# Patient Record
(demographics unavailable — no encounter records)

---

## 2024-11-11 NOTE — WORK
[Torn Ligament/Tendon/Muscle] : torn ligament, tendon or muscle [Was the competent medical cause of the injury] : was the competent medical cause of the injury [Are consistent with the injury] : are consistent with the injury [Consistent with my objective findings] : consistent with my objective findings [Does not reveal pre-existing condition(s) that may affect treatment/prognosis] : does not reveal pre-existing condition(s) that may affect treatment/prognosis [N/A] : : Not Applicable [Patient] : patient [No Rx restrictions] : No Rx restrictions. [I provided the services listed above] :  I provided the services listed above. [Severe Partial] : severe partial [Can return to work with limitations on ______] : can return to work with limitations on [unfilled] [Lifting] : lifting [FreeTextEntry1] : fair

## 2024-11-11 NOTE — HISTORY OF PRESENT ILLNESS
[de-identified] : The patient is presenting to the office c/o ongoing right shoulder pain as the result of a work related injury on 11/11/22. The patient works as a [employer]. Patient reports he was attempting to restrain when he started experiencing pain. The patient denies any problems before the injury. Patient has been doing supervised PT with slight improvement. Patient reports pain has been getting progressively worse. Pain is worse at night. Patient denies numbness or tingling. The patient has not been working at 100% capacity since the injury. Presents to the office with MRI for review.  5/8/23: Patient presents to the office for right shoulder pain. Pt states AC joint injection worked great since last visit.  He did however report waking up with severe lateral and deep shoulder pain 2 weeks ago.  5/15/23 Pt presents to the office for right shoulder pain.  He reports pain deep in the joint and lateral.  Previous AC joint provided significant relief.  6/19/23: Pt presents to the office for right shoulder pain. Prior GH injection didn't provide any relief. Pain has been better since the last cortisone injection but pain has recently gotten worse over the last couple of weeks.   11/3/23:  Pt presents to the office for right shoulder pain.  Patient had a reoccurrence of right shoulder pain and is now not working. Patient never underwent MRI arthrogram of right shoulder.  11/27/23: Patient here for right shoulder pain. Pt here for review MRI arthrogram results. Pt reports pain has not gotten better since last visit. Pt would like to discuss definitive treatment options  12/20/23: Patient presents today for a follow-up of worsening right shoulder pain.  He has failed all conservative management including multiple cortisone injections and physical therapy.  Pain has been getting progressively worse.  Wishes to discuss surgery 1/5/24: Patient presents 8days s/p right arthroscopic rotator cuff debridement, SAD open DCE and biceps tenodesis. Patient is doing well, pain is controlled.  Patient has started to wean from sling. ROM improving. Denies any fever, chills, drainage. 2/2/24: Patient presents 5 weeks  s/p right arthroscopic rotator cuff debridement, SAD open DCE and biceps tenodesis. Pt doing well and is happy with his progress.  Range of motion and strength progressing. 3/15/24: Patient returns today nearly 3 months removed from right shoulder arthroscopic debridement, subacromial decompression and open distal clavicle excision and biceps tenodesis.  He continues to report steady improvement with physical therapy.  Does report mild residual weakness and lateral discomfort with overhead activity. 5/6/24: Patient presents 4.5 months removed from right shoulder arthroscopic debridement, subacromial decompression and open distal clavicle excision and biceps tenodesis. Patient notes pain with motions away from his body.  6/21/24: Patient presents nearly 6 months removed from right shoulder arthroscopic debridement, subacromial decompression and open distal clavicle excision and biceps tenodesis. Pt admits the prior glenohumeral injection has been providing moderate relief. Pt still has pain with internal rotation 11/11/24: Patient presents today 10 months removed from right shoulder arthroscopic debridement, subacromial depression open distal clavicle excision and bicep tenodesis.  Admits to mild residual lateral pain.  Pain is worse with overhead motion.  Overall is significantly better than before surgery.

## 2024-11-11 NOTE — PHYSICAL EXAM
[de-identified] : Constitutional: The general appearance of the patient is well developed, well nourished, no deformities and well groomed. Normal  Gait: Gait and function is as follows: normal gait.  Skin: Head and neck visualized skin is normal. Left upper extremity visualized skin is normal. Right upper extremity visualized skin is normal. Thoracic Skin of the thoracic spine shows visualized skin is normal.  Cardiovascular: palpable radial pulse bilaterally, good capillary refill in digits of the bilateral upper extremities and no temperature or color changes in the bilateral upper extremities.  Lymphatic: Normal Palpation of lymph nodes in the cervical.  Neurologic: fine motor control in the bilateral upper extremities is intact. Deep Tendon Reflexes in Upper and Lower Extremities Negative Epstein's in the bilateral upper extremities. The patient is oriented to time, place and person. Sensation to light touch intact in the bilateral upper extremities. Mood and Affect is normal.  Right Shoulder:  Inspection of the shoulder/upper arm is as follows: Stable shoulder. Palpation of the shoulder/upper arm is as follows: There is mild diffuse tenderness . Range of motion of the shoulder is as follows: Limited secondary to immobilization/surgery. Strength of the shoulder is as follows:  Deltoid 5/5 Ligament Stability and Special Tests of the shoulder is as follows: Stable shoulder Incisions benign, no erythema/ swelling. Left Shoulder: Inspection of the shoulder/upper arm is as follows: There is a full, pain-free, stable range of motion of the shoulder with normal strength and no tenderness to palpation.  Neck:  Inspection / Palpation of the cervical spine is as follows: mild paracervical tenderness. Range of motion of the cervical spine is as follows: moderately decreased range of motion of the cervical spine. Stability testing for the cervical spine is as follows Stable range of motion.  Back, including spine: Inspection / Palpation of the thoracic/lumbar spine is as follows: There is a full, pain free, stable range of motion of the thoracic spine with a normal tone and not tenderness to palpation..

## 2024-11-11 NOTE — DISCUSSION/SUMMARY
[de-identified] : RUE: tight with IR  This is a 37 yo male nearly 11 months s/p right arthroscopic rotator cuff debridement, subacromial decompression, open AC joint stabilization and mini open biceps tenodesis.  Range of motion and strength steadily improving. Patient does have slight lateral pain persistent at this point likely secondary to tight posterior capsule. Emphasized the importance of continuing with home exercise stretching program. Overall AC joint is significantly better than before surgery. He will continue to progress at all activities as tolerated. Patient will continue to work modified duty. He will follow-up in 6 weeks.  He will be considered to reach maximal medical improvement at that point.  (1) We discussed a comprehensive treatment plans that included a prescription management plan involving the use of prescription strength medications to include Ibuprofen 600-800 mg TID, versus 500-650 mg Tylenol. We also discussed prescribing topical diclofenac (Voltaren gel) as well as once daily Meloxicam 15 mg. (2) The patient has More Than One chronic injuries/illnesses as outlined, discussed, and documented by ICD 10 codes listed, as well as the HPI and Plan section. There is a moderate risk of morbidity with further treatment, especially from use of prescription strength medications and possible side effects of these medications which include upset stomach and cardiac/renal issues with long term use were discussed. (3) I recommended that the patient follow-up with their medical physician to discuss any significant specific potential issues with long term use such as interactions with current medications or with exacerbation of underlying medical morbidities.

## 2024-11-11 NOTE — DATA REVIEWED
[FreeTextEntry1] : MRI arthrogram right shoulder 11/14/23 [facility]  Severe AC joint arthrosis, more pronounced than on the prior study. There is mild osteolysis along the distal clavicle and surrounding bone marrow edema.  Mild to moderate supraspinatus tendinosis with slight fraying of the bursal margin. There is no discrete supraspinatus tendon tear.  Mild infraspinatus tendinosis with a very shallow articular sided tear at the central aspect of the insertion.  Shallow partial-thickness tear at the direct superior chondral labral junction.

## 2024-11-11 NOTE — HISTORY OF PRESENT ILLNESS
[de-identified] : The patient is presenting to the office c/o ongoing right shoulder pain as the result of a work related injury on 11/11/22. The patient works as a [employer]. Patient reports he was attempting to restrain when he started experiencing pain. The patient denies any problems before the injury. Patient has been doing supervised PT with slight improvement. Patient reports pain has been getting progressively worse. Pain is worse at night. Patient denies numbness or tingling. The patient has not been working at 100% capacity since the injury. Presents to the office with MRI for review.  5/8/23: Patient presents to the office for right shoulder pain. Pt states AC joint injection worked great since last visit.  He did however report waking up with severe lateral and deep shoulder pain 2 weeks ago.  5/15/23 Pt presents to the office for right shoulder pain.  He reports pain deep in the joint and lateral.  Previous AC joint provided significant relief.  6/19/23: Pt presents to the office for right shoulder pain. Prior GH injection didn't provide any relief. Pain has been better since the last cortisone injection but pain has recently gotten worse over the last couple of weeks.   11/3/23:  Pt presents to the office for right shoulder pain.  Patient had a reoccurrence of right shoulder pain and is now not working. Patient never underwent MRI arthrogram of right shoulder.  11/27/23: Patient here for right shoulder pain. Pt here for review MRI arthrogram results. Pt reports pain has not gotten better since last visit. Pt would like to discuss definitive treatment options  12/20/23: Patient presents today for a follow-up of worsening right shoulder pain.  He has failed all conservative management including multiple cortisone injections and physical therapy.  Pain has been getting progressively worse.  Wishes to discuss surgery 1/5/24: Patient presents 8days s/p right arthroscopic rotator cuff debridement, SAD open DCE and biceps tenodesis. Patient is doing well, pain is controlled.  Patient has started to wean from sling. ROM improving. Denies any fever, chills, drainage. 2/2/24: Patient presents 5 weeks  s/p right arthroscopic rotator cuff debridement, SAD open DCE and biceps tenodesis. Pt doing well and is happy with his progress.  Range of motion and strength progressing. 3/15/24: Patient returns today nearly 3 months removed from right shoulder arthroscopic debridement, subacromial decompression and open distal clavicle excision and biceps tenodesis.  He continues to report steady improvement with physical therapy.  Does report mild residual weakness and lateral discomfort with overhead activity. 5/6/24: Patient presents 4.5 months removed from right shoulder arthroscopic debridement, subacromial decompression and open distal clavicle excision and biceps tenodesis. Patient notes pain with motions away from his body.  6/21/24: Patient presents nearly 6 months removed from right shoulder arthroscopic debridement, subacromial decompression and open distal clavicle excision and biceps tenodesis. Pt admits the prior glenohumeral injection has been providing moderate relief. Pt still has pain with internal rotation 11/11/24: Patient presents today 10 months removed from right shoulder arthroscopic debridement, subacromial depression open distal clavicle excision and bicep tenodesis.  Admits to mild residual lateral pain.  Pain is worse with overhead motion.  Overall is significantly better than before surgery.

## 2024-11-11 NOTE — PHYSICAL EXAM
[de-identified] : Constitutional: The general appearance of the patient is well developed, well nourished, no deformities and well groomed. Normal  Gait: Gait and function is as follows: normal gait.  Skin: Head and neck visualized skin is normal. Left upper extremity visualized skin is normal. Right upper extremity visualized skin is normal. Thoracic Skin of the thoracic spine shows visualized skin is normal.  Cardiovascular: palpable radial pulse bilaterally, good capillary refill in digits of the bilateral upper extremities and no temperature or color changes in the bilateral upper extremities.  Lymphatic: Normal Palpation of lymph nodes in the cervical.  Neurologic: fine motor control in the bilateral upper extremities is intact. Deep Tendon Reflexes in Upper and Lower Extremities Negative Epstein's in the bilateral upper extremities. The patient is oriented to time, place and person. Sensation to light touch intact in the bilateral upper extremities. Mood and Affect is normal.  Right Shoulder:  Inspection of the shoulder/upper arm is as follows: Stable shoulder. Palpation of the shoulder/upper arm is as follows: There is mild diffuse tenderness . Range of motion of the shoulder is as follows: Limited secondary to immobilization/surgery. Strength of the shoulder is as follows:  Deltoid 5/5 Ligament Stability and Special Tests of the shoulder is as follows: Stable shoulder Incisions benign, no erythema/ swelling. Left Shoulder: Inspection of the shoulder/upper arm is as follows: There is a full, pain-free, stable range of motion of the shoulder with normal strength and no tenderness to palpation.  Neck:  Inspection / Palpation of the cervical spine is as follows: mild paracervical tenderness. Range of motion of the cervical spine is as follows: moderately decreased range of motion of the cervical spine. Stability testing for the cervical spine is as follows Stable range of motion.  Back, including spine: Inspection / Palpation of the thoracic/lumbar spine is as follows: There is a full, pain free, stable range of motion of the thoracic spine with a normal tone and not tenderness to palpation..

## 2024-11-11 NOTE — DISCUSSION/SUMMARY
[de-identified] : RUE: tight with IR  This is a 39 yo male nearly 11 months s/p right arthroscopic rotator cuff debridement, subacromial decompression, open AC joint stabilization and mini open biceps tenodesis.  Range of motion and strength steadily improving. Patient does have slight lateral pain persistent at this point likely secondary to tight posterior capsule. Emphasized the importance of continuing with home exercise stretching program. Overall AC joint is significantly better than before surgery. He will continue to progress at all activities as tolerated. Patient will continue to work modified duty. He will follow-up in 6 weeks.  He will be considered to reach maximal medical improvement at that point.  (1) We discussed a comprehensive treatment plans that included a prescription management plan involving the use of prescription strength medications to include Ibuprofen 600-800 mg TID, versus 500-650 mg Tylenol. We also discussed prescribing topical diclofenac (Voltaren gel) as well as once daily Meloxicam 15 mg. (2) The patient has More Than One chronic injuries/illnesses as outlined, discussed, and documented by ICD 10 codes listed, as well as the HPI and Plan section. There is a moderate risk of morbidity with further treatment, especially from use of prescription strength medications and possible side effects of these medications which include upset stomach and cardiac/renal issues with long term use were discussed. (3) I recommended that the patient follow-up with their medical physician to discuss any significant specific potential issues with long term use such as interactions with current medications or with exacerbation of underlying medical morbidities.

## 2024-12-23 NOTE — WORK
[Torn Ligament/Tendon/Muscle] : torn ligament, tendon or muscle [Was the competent medical cause of the injury] : was the competent medical cause of the injury [Are consistent with the injury] : are consistent with the injury [Consistent with my objective findings] : consistent with my objective findings [Severe Partial] : severe partial [Does not reveal pre-existing condition(s) that may affect treatment/prognosis] : does not reveal pre-existing condition(s) that may affect treatment/prognosis [Can return to work with limitations on ______] : can return to work with limitations on [unfilled] [Lifting] : lifting [N/A] : : Not Applicable [Patient] : patient [No Rx restrictions] : No Rx restrictions. [I provided the services listed above] :  I provided the services listed above. [FreeTextEntry1] : fair [Has the patient reached Maximum Medical Improvement? If yes, indicate date___] : Yes, on [unfilled] [Is there permanent partial impairment?] : Yes [FreeTextEntry6] :  SLU Right shoulder 30% Of the note patient injury to groin which healed uneventfully with no residual deficit. 0% Groin

## 2024-12-23 NOTE — DISCUSSION/SUMMARY
[de-identified] : RUE: tight with IR  This is a 37 yo male nearly 1 year s/p right arthroscopic rotator cuff debridement, subacromial decompression, open AC joint stabilization and mini open biceps tenodesis.  Range of motion and strength steadily improving. Overall AC joint is significantly better than before surgery. He will continue to progress at all activities as tolerated. Patient will continue to work light duty Patient has reached maximum medical improvement and is amenable to SLU    RUE (injured side) Active:  | Abduction: 90 | ER @ 0 abduction: 30 | ER @90 abduction: 20 | IR to L5 (20 deg) Passive:  | Abduction: 90 | ER @ 0 abduction: 40 | ER @90 abduction: 20 | IR to L5 (20 deg)   LUE Uninvolved: Active:  | Abduction: 130 | ER @ 0 abduction: 50 | ER @90 abduction: 50 | IR to L1 (40 deg) Passive:  | Abduction: 140 | ER @ 0 abduction: 50 | ER @90 abduction: 50 | IR to L1 (40 deg)  *measurements taken active and passive 3x each with highest value recorded.   The Patient has moderate loss of  Internal rotation The patient has moderate loss of  abduction The patient has had a resection of the distal end of the clavicle  The patient had partial rupture of the long head of the biceps requiring transfer   SLU Right shoulder 30% Of the note patient injury to groin which healed uneventfully with no residual deficit.   (1) We discussed a comprehensive treatment plans that included a prescription management plan involving the use of prescription strength medications to include Ibuprofen 600-800 mg TID, versus 500-650 mg Tylenol. We also discussed prescribing topical diclofenac (Voltaren gel) as well as once daily Meloxicam 15 mg. (2) The patient has More Than One chronic injuries/illnesses as outlined, discussed, and documented by ICD 10 codes listed, as well as the HPI and Plan section. There is a moderate risk of morbidity with further treatment, especially from use of prescription strength medications and possible side effects of these medications which include upset stomach and cardiac/renal issues with long term use were discussed. (3) I recommended that the patient follow-up with their medical physician to discuss any significant specific potential issues with long term use such as interactions with current medications or with exacerbation of underlying medical morbidities.   Attestation: I, Emma O'Blue , attest that this documentation has been prepared under the direction and in the presence of Provider Matthew Short MD. The documentation recorded by the scribe, in my presence, accurately reflects the service I personally performed, and the decisions made by me with my edits as appropriate. Matthew Short MD

## 2024-12-23 NOTE — HISTORY OF PRESENT ILLNESS
[de-identified] : The patient is presenting to the office c/o ongoing right shoulder pain as the result of a work related injury on 11/11/22. The patient works as a [employer]. Patient reports he was attempting to restrain when he started experiencing pain. The patient denies any problems before the injury. Patient has been doing supervised PT with slight improvement. Patient reports pain has been getting progressively worse. Pain is worse at night. Patient denies numbness or tingling. The patient has not been working at 100% capacity since the injury. Presents to the office with MRI for review.  5/8/23: Patient presents to the office for right shoulder pain. Pt states AC joint injection worked great since last visit.  He did however report waking up with severe lateral and deep shoulder pain 2 weeks ago.  5/15/23 Pt presents to the office for right shoulder pain.  He reports pain deep in the joint and lateral.  Previous AC joint provided significant relief.  6/19/23: Pt presents to the office for right shoulder pain. Prior GH injection didn't provide any relief. Pain has been better since the last cortisone injection but pain has recently gotten worse over the last couple of weeks.   11/3/23:  Pt presents to the office for right shoulder pain.  Patient had a reoccurrence of right shoulder pain and is now not working. Patient never underwent MRI arthrogram of right shoulder.  11/27/23: Patient here for right shoulder pain. Pt here for review MRI arthrogram results. Pt reports pain has not gotten better since last visit. Pt would like to discuss definitive treatment options  12/20/23: Patient presents today for a follow-up of worsening right shoulder pain.  He has failed all conservative management including multiple cortisone injections and physical therapy.  Pain has been getting progressively worse.  Wishes to discuss surgery 1/5/24: Patient presents 8days s/p right arthroscopic rotator cuff debridement, SAD open DCE and biceps tenodesis. Patient is doing well, pain is controlled.  Patient has started to wean from sling. ROM improving. Denies any fever, chills, drainage. 2/2/24: Patient presents 5 weeks  s/p right arthroscopic rotator cuff debridement, SAD open DCE and biceps tenodesis. Pt doing well and is happy with his progress.  Range of motion and strength progressing. 3/15/24: Patient returns today nearly 3 months removed from right shoulder arthroscopic debridement, subacromial decompression and open distal clavicle excision and biceps tenodesis.  He continues to report steady improvement with physical therapy.  Does report mild residual weakness and lateral discomfort with overhead activity. 5/6/24: Patient presents 4.5 months removed from right shoulder arthroscopic debridement, subacromial decompression and open distal clavicle excision and biceps tenodesis. Patient notes pain with motions away from his body.  6/21/24: Patient presents nearly 6 months removed from right shoulder arthroscopic debridement, subacromial decompression and open distal clavicle excision and biceps tenodesis. Pt admits the prior glenohumeral injection has been providing moderate relief. Pt still has pain with internal rotation 11/11/24: Patient presents today 10 months removed from right shoulder arthroscopic debridement, subacromial depression open distal clavicle excision and bicep tenodesis.  Admits to mild residual lateral pain.  Pain is worse with overhead motion.  Overall is significantly better than before surgery. 12/23/24: Patient presents 1 year removed from right shoulder arthroscopic debridement, subacromial depression open distal clavicle excision and bicep tenodesis. Patient reports the groin injury he sustained from the initial injury has since healed. Patient reports pain with motion overhead. Patient is working light duty. Patient here for U

## 2024-12-23 NOTE — PHYSICAL EXAM
[de-identified] : Constitutional: The general appearance of the patient is well developed, well nourished, no deformities and well groomed. Normal  Gait: Gait and function is as follows: normal gait.  Skin: Head and neck visualized skin is normal. Left upper extremity visualized skin is normal. Right upper extremity visualized skin is normal. Thoracic Skin of the thoracic spine shows visualized skin is normal.  Cardiovascular: palpable radial pulse bilaterally, good capillary refill in digits of the bilateral upper extremities and no temperature or color changes in the bilateral upper extremities.  Lymphatic: Normal Palpation of lymph nodes in the cervical.  Neurologic: fine motor control in the bilateral upper extremities is intact. Deep Tendon Reflexes in Upper and Lower Extremities Negative Epstein's in the bilateral upper extremities. The patient is oriented to time, place and person. Sensation to light touch intact in the bilateral upper extremities. Mood and Affect is normal.  Right Shoulder:  Inspection of the shoulder/upper arm is as follows: Stable shoulder. Palpation of the shoulder/upper arm is as follows: There is mild diffuse tenderness . Range of motion of the shoulder is as follows: Limited secondary to immobilization/surgery. Strength of the shoulder is as follows:  Deltoid 5/5 Ligament Stability and Special Tests of the shoulder is as follows: Stable shoulder Incisions benign, no erythema/ swelling. Left Shoulder: Inspection of the shoulder/upper arm is as follows: There is a full, pain-free, stable range of motion of the shoulder with normal strength and no tenderness to palpation.  Neck:  Inspection / Palpation of the cervical spine is as follows: mild paracervical tenderness. Range of motion of the cervical spine is as follows: moderately decreased range of motion of the cervical spine. Stability testing for the cervical spine is as follows Stable range of motion.  Back, including spine: Inspection / Palpation of the thoracic/lumbar spine is as follows: There is a full, pain free, stable range of motion of the thoracic spine with a normal tone and not tenderness to palpation..